# Patient Record
Sex: FEMALE | Race: OTHER | HISPANIC OR LATINO | URBAN - METROPOLITAN AREA
[De-identification: names, ages, dates, MRNs, and addresses within clinical notes are randomized per-mention and may not be internally consistent; named-entity substitution may affect disease eponyms.]

---

## 2023-03-16 ENCOUNTER — EMERGENCY (EMERGENCY)
Facility: HOSPITAL | Age: 28
LOS: 1 days | Discharge: HOME | End: 2023-03-16
Payer: MEDICAID

## 2023-03-16 VITALS
SYSTOLIC BLOOD PRESSURE: 117 MMHG | OXYGEN SATURATION: 98 % | DIASTOLIC BLOOD PRESSURE: 74 MMHG | WEIGHT: 138.89 LBS | HEART RATE: 86 BPM | TEMPERATURE: 100 F | RESPIRATION RATE: 18 BRPM | HEIGHT: 61.02 IN

## 2023-03-16 DIAGNOSIS — Z90.49 ACQUIRED ABSENCE OF OTHER SPECIFIED PARTS OF DIGESTIVE TRACT: Chronic | ICD-10-CM

## 2023-03-16 LAB
FLUAV AG NPH QL: SIGNIFICANT CHANGE UP
FLUBV AG NPH QL: SIGNIFICANT CHANGE UP
SARS-COV-2 RNA SPEC QL NAA+PROBE: SIGNIFICANT CHANGE UP

## 2023-03-16 PROCEDURE — 87637 SARSCOV2&INF A&B&RSV AMP PRB: CPT

## 2023-03-16 PROCEDURE — 99284 EMERGENCY DEPT VISIT MOD MDM: CPT

## 2023-03-16 PROCEDURE — 99283 EMERGENCY DEPT VISIT LOW MDM: CPT

## 2023-03-16 RX ORDER — IBUPROFEN 200 MG
1 TABLET ORAL
Qty: 20 | Refills: 0
Start: 2023-03-16 | End: 2023-03-20

## 2023-03-16 RX ORDER — IPRATROPIUM BROMIDE 21 MCG
2 AEROSOL, SPRAY (ML) NASAL
Qty: 1 | Refills: 0
Start: 2023-03-16 | End: 2023-03-19

## 2023-03-16 RX ORDER — DEXTROMETHORPHAN HYDROBROMIDE AND PROMETHAZINE HYDROCHLORIDE 15; 6.25 MG/5ML; MG/5ML
5 SYRUP ORAL
Qty: 100 | Refills: 0
Start: 2023-03-16 | End: 2023-03-20

## 2023-03-16 RX ORDER — IBUPROFEN 200 MG
600 TABLET ORAL ONCE
Refills: 0 | Status: COMPLETED | OUTPATIENT
Start: 2023-03-16 | End: 2023-03-16

## 2023-03-16 RX ORDER — DEXAMETHASONE 0.5 MG/5ML
10 ELIXIR ORAL ONCE
Refills: 0 | Status: COMPLETED | OUTPATIENT
Start: 2023-03-16 | End: 2023-03-16

## 2023-03-16 RX ADMIN — Medication 10 MILLIGRAM(S): at 22:28

## 2023-03-16 RX ADMIN — Medication 600 MILLIGRAM(S): at 22:28

## 2023-03-16 NOTE — ED PROVIDER NOTE - NSFOLLOWUPINSTRUCTIONS_ED_ALL_ED_FT
Los resultados de garcia prueba de hisopado nasal estarán disponibles en el portal del paciente dentro de las próximas 24 horas. Puede acceder al portal para pacientes FollowMyHealth ofrecido por [facility] registrándose en el siguiente sitio web: [email or web address]. Si no puede acceder a los resultados, puede llamar al departamento de emergencias al [phone] después de 24 horas para obtener livia resultados.    Si da positivo por covid, debe hacer cuarentena soniya 5 días desde el inicio de los síntomas o desde la fecha del hisopado si es asintomático.    [place] motrin y/o tylenol según sea necesario para la fiebre o el dolor    Medicamentos enviados a la farmacia por usted. Melissa según las indicaciones. A veces, el medicamento para la tos no está cubierto por el seguro, si cassi es el jacqueline, puede pagarlo de garcia bolsillo o melissa medicamentos de venta lyly para el resfriado leo DayQuil o NyQuil severo para tratarlo.    Para el dolor de garganta puede utilizar Pastillas de Cepacol.    Si experimenta síntomas nuevos o que empeoran, leo dolor en el pecho o dificultad para respirar, o si está preocupado, siempre puede regresar a la emergencia para mary reevaluación.

## 2023-03-16 NOTE — ED PROVIDER NOTE - OBJECTIVE STATEMENT
27-year-old female with history of appendectomy presents with sore throat x6 days and cough for 3 weeks.  Patient also reporting subjective fevers, ear pain, body aches and headaches.  Patient took Tylenol and TheraFlu for her symptoms with minimal relief.  Patient denies chest pain, shortness of breath, abdominal pain, nausea, vomiting, diarrhea

## 2023-03-16 NOTE — ED PROVIDER NOTE - CLINICAL SUMMARY MEDICAL DECISION MAKING FREE TEXT BOX
27-year-old female with history of appendectomy presents with sore throat x6 days and cough for 3 weeks.  Patient also reporting subjective fevers, ear pain, body aches and headaches. Well appearing on exam. Likely viral illness, possibly covid or the Flu. Will test for covid/flu and dc home with supportive care.

## 2023-03-16 NOTE — ED PROVIDER NOTE - PATIENT PORTAL LINK FT
You can access the FollowMyHealth Patient Portal offered by [facility] by registering at the following website: [email or web address]. By joining [employer]’s FollowMyHealth portal, you will also be able to view your health information using other applications (apps) compatible with our system.

## 2023-03-16 NOTE — ED PROVIDER NOTE - CONSTITUTIONAL, MLM
normal... uncomfortable, awake, alert, oriented to person, place, time/situation and in no apparent distress.

## 2023-03-16 NOTE — ED PROVIDER NOTE - ENMT, MLM
Airway patent, +nasal congestion with post nasal drip. Mouth with normal mucosa. Throat has no vesicles, no oropharyngeal exudates and uvula is midline. +b/l anterior cervical lymph notes